# Patient Record
Sex: FEMALE | Race: WHITE | NOT HISPANIC OR LATINO | URBAN - METROPOLITAN AREA
[De-identification: names, ages, dates, MRNs, and addresses within clinical notes are randomized per-mention and may not be internally consistent; named-entity substitution may affect disease eponyms.]

---

## 2022-07-09 ENCOUNTER — TELEPHONE ENCOUNTER (OUTPATIENT)
Dept: URBAN - METROPOLITAN AREA CLINIC 121 | Facility: CLINIC | Age: 57
End: 2022-07-09

## 2022-07-10 ENCOUNTER — TELEPHONE ENCOUNTER (OUTPATIENT)
Dept: URBAN - METROPOLITAN AREA CLINIC 121 | Facility: CLINIC | Age: 57
End: 2022-07-10

## 2022-07-30 ENCOUNTER — TELEPHONE ENCOUNTER (OUTPATIENT)
Age: 57
End: 2022-07-30

## 2022-07-31 ENCOUNTER — TELEPHONE ENCOUNTER (OUTPATIENT)
Age: 57
End: 2022-07-31

## 2022-09-12 ENCOUNTER — WEB ENCOUNTER (OUTPATIENT)
Dept: URBAN - METROPOLITAN AREA CLINIC 7 | Facility: CLINIC | Age: 57
End: 2022-09-12

## 2022-09-12 ENCOUNTER — LAB OUTSIDE AN ENCOUNTER (OUTPATIENT)
Dept: URBAN - METROPOLITAN AREA CLINIC 7 | Facility: CLINIC | Age: 57
End: 2022-09-12

## 2022-09-12 ENCOUNTER — OFFICE VISIT (OUTPATIENT)
Dept: URBAN - METROPOLITAN AREA CLINIC 7 | Facility: CLINIC | Age: 57
End: 2022-09-12
Payer: COMMERCIAL

## 2022-09-12 VITALS
TEMPERATURE: 97.5 F | SYSTOLIC BLOOD PRESSURE: 110 MMHG | WEIGHT: 140 LBS | HEIGHT: 63 IN | BODY MASS INDEX: 24.8 KG/M2 | DIASTOLIC BLOOD PRESSURE: 72 MMHG

## 2022-09-12 DIAGNOSIS — K83.8 DILATION OF BILIARY TRACT: ICD-10-CM

## 2022-09-12 DIAGNOSIS — Z98.84 GASTRIC BYPASS STATUS FOR OBESITY: ICD-10-CM

## 2022-09-12 DIAGNOSIS — K21.9 GERD WITHOUT ESOPHAGITIS: ICD-10-CM

## 2022-09-12 DIAGNOSIS — K55.1 SUPERIOR MESENTERIC ARTERY SYNDROME: ICD-10-CM

## 2022-09-12 DIAGNOSIS — R10.84 GENERALIZED ABDOMINAL PAIN: ICD-10-CM

## 2022-09-12 PROBLEM — 698450007: Status: ACTIVE | Noted: 2022-09-12

## 2022-09-12 PROCEDURE — 99204 OFFICE O/P NEW MOD 45 MIN: CPT | Performed by: INTERNAL MEDICINE

## 2022-09-12 RX ORDER — OXYCODONE HYDROCHLORIDE AND ACETAMINOPHEN 10; 325 MG/1; MG/1
TAKE ONE TABLET BY MOUTH EVERY 4 HOURS AS NEEDED FOR PAIN TABLET ORAL
Qty: 180 UNSPECIFIED | Refills: 0 | Status: ACTIVE | COMMUNITY

## 2022-09-12 RX ORDER — ONDANSETRON 4 MG/1
PLACE ONE TABLET ON THE TONGUE EVERY 8 HOURS AS NEEDED TABLET, ORALLY DISINTEGRATING ORAL
Qty: 18 UNSPECIFIED | Refills: 0 | Status: ACTIVE | COMMUNITY

## 2022-09-12 RX ORDER — MULTIVITAMIN WITH IRON
1 TABLET WITH A MEAL TABLET ORAL ONCE A DAY
Qty: 30 | Status: ACTIVE | COMMUNITY
Start: 2022-09-12 | End: 2022-10-12

## 2022-09-12 RX ORDER — PANTOPRAZOLE SODIUM 40 MG/1
TABLET, DELAYED RELEASE ORAL
Qty: 180 TABLET | Status: ACTIVE | COMMUNITY

## 2022-09-12 NOTE — HPI-TODAY'S VISIT:
Patient is seen today with a complaint of abdominal  pain and concern about a partial bowel obstruction. The pain is of  months Sxs are recurrent and intermittent. The pain is a vague bloating,aching dull discomfort. It is poorly localized to epigastricum and central abdomen. It is non radiating  No exacerbating factors - unrelated to eating,change in position ,activity. Relieving factors - erutucation of flatus Severity mild Associated with nausea but no vomiting  Associated with increased GERD, early satiety and dyspepsia. No recent fevers or chills No weight loss No change in bowel habits. No dark urine or alcoholic stools. No significant NSAID use history. Distant hx biliary stricture for which had [facility] eval and then biliary bypass surgery Dr Caban.

## 2022-10-06 ENCOUNTER — OFFICE VISIT (OUTPATIENT)
Dept: URBAN - METROPOLITAN AREA SURGERY CENTER 5 | Facility: SURGERY CENTER | Age: 57
End: 2022-10-06

## 2022-10-25 ENCOUNTER — OFFICE VISIT (OUTPATIENT)
Dept: URBAN - METROPOLITAN AREA SURGERY CENTER 5 | Facility: SURGERY CENTER | Age: 57
End: 2022-10-25

## 2022-11-09 ENCOUNTER — CLAIMS CREATED FROM THE CLAIM WINDOW (OUTPATIENT)
Dept: URBAN - METROPOLITAN AREA CLINIC 4 | Facility: CLINIC | Age: 57
End: 2022-11-09
Payer: COMMERCIAL

## 2022-11-09 ENCOUNTER — TELEPHONE ENCOUNTER (OUTPATIENT)
Dept: URBAN - METROPOLITAN AREA CLINIC 7 | Facility: CLINIC | Age: 57
End: 2022-11-09

## 2022-11-09 ENCOUNTER — OFFICE VISIT (OUTPATIENT)
Dept: URBAN - METROPOLITAN AREA SURGERY CENTER 5 | Facility: SURGERY CENTER | Age: 57
End: 2022-11-09
Payer: COMMERCIAL

## 2022-11-09 DIAGNOSIS — R14.0 ABDOMINAL BLOATING: ICD-10-CM

## 2022-11-09 DIAGNOSIS — R10.13 ABDOMINAL DISCOMFORT, EPIGASTRIC: ICD-10-CM

## 2022-11-09 DIAGNOSIS — K31.A0 GASTRIC INTESTINAL METAPLASIA, UNSPECIFIED: ICD-10-CM

## 2022-11-09 DIAGNOSIS — Z98.84 H/O GASTRIC BYPASS: ICD-10-CM

## 2022-11-09 PROCEDURE — 88342 IMHCHEM/IMCYTCHM 1ST ANTB: CPT | Performed by: PATHOLOGY

## 2022-11-09 PROCEDURE — 43239 EGD BIOPSY SINGLE/MULTIPLE: CPT | Performed by: INTERNAL MEDICINE

## 2022-11-09 PROCEDURE — 88305 TISSUE EXAM BY PATHOLOGIST: CPT | Performed by: PATHOLOGY

## 2022-11-09 PROCEDURE — 43239 EGD BIOPSY SINGLE/MULTIPLE: CPT | Performed by: CLINIC/CENTER

## 2022-11-09 RX ORDER — OXYCODONE HYDROCHLORIDE AND ACETAMINOPHEN 10; 325 MG/1; MG/1
TAKE ONE TABLET BY MOUTH EVERY 4 HOURS AS NEEDED FOR PAIN TABLET ORAL
Qty: 180 UNSPECIFIED | Refills: 0 | Status: ACTIVE | COMMUNITY

## 2022-11-09 RX ORDER — ONDANSETRON 4 MG/1
PLACE ONE TABLET ON THE TONGUE EVERY 8 HOURS AS NEEDED TABLET, ORALLY DISINTEGRATING ORAL
Qty: 18 UNSPECIFIED | Refills: 0 | Status: ACTIVE | COMMUNITY

## 2022-11-09 RX ORDER — PANTOPRAZOLE SODIUM 40 MG/1
TABLET, DELAYED RELEASE ORAL
Qty: 180 TABLET | Status: ACTIVE | COMMUNITY

## 2023-01-23 ENCOUNTER — WEB ENCOUNTER (OUTPATIENT)
Dept: URBAN - METROPOLITAN AREA CLINIC 7 | Facility: CLINIC | Age: 58
End: 2023-01-23

## 2023-01-23 ENCOUNTER — OFFICE VISIT (OUTPATIENT)
Dept: URBAN - METROPOLITAN AREA CLINIC 7 | Facility: CLINIC | Age: 58
End: 2023-01-23
Payer: COMMERCIAL

## 2023-01-23 VITALS
TEMPERATURE: 97.8 F | SYSTOLIC BLOOD PRESSURE: 102 MMHG | WEIGHT: 140 LBS | DIASTOLIC BLOOD PRESSURE: 60 MMHG | BODY MASS INDEX: 24.8 KG/M2 | RESPIRATION RATE: 16 BRPM | HEIGHT: 63 IN

## 2023-01-23 DIAGNOSIS — K21.9 GERD WITHOUT ESOPHAGITIS: ICD-10-CM

## 2023-01-23 DIAGNOSIS — Z98.84 H/O GASTRIC BYPASS: ICD-10-CM

## 2023-01-23 DIAGNOSIS — R19.4 CHANGE IN BOWEL HABITS: ICD-10-CM

## 2023-01-23 PROBLEM — 266435005: Status: ACTIVE | Noted: 2022-09-12

## 2023-01-23 PROBLEM — 88111009: Status: ACTIVE | Noted: 2023-01-23

## 2023-01-23 PROCEDURE — 99214 OFFICE O/P EST MOD 30 MIN: CPT | Performed by: INTERNAL MEDICINE

## 2023-01-23 RX ORDER — OXYCODONE HYDROCHLORIDE AND ACETAMINOPHEN 10; 325 MG/1; MG/1
TAKE ONE TABLET BY MOUTH EVERY 4 HOURS AS NEEDED FOR PAIN TABLET ORAL
Qty: 180 UNSPECIFIED | Refills: 0 | Status: ACTIVE | COMMUNITY

## 2023-01-23 RX ORDER — ONDANSETRON 4 MG/1
PLACE ONE TABLET ON THE TONGUE EVERY 8 HOURS AS NEEDED TABLET, ORALLY DISINTEGRATING ORAL
Qty: 18 UNSPECIFIED | Refills: 0 | Status: ACTIVE | COMMUNITY

## 2023-01-23 RX ORDER — PANTOPRAZOLE SODIUM 40 MG/1
TABLET, DELAYED RELEASE ORAL
Qty: 180 TABLET | Status: ACTIVE | COMMUNITY

## 2023-01-23 NOTE — HPI-TODAY'S VISIT:
Seen today regarding a change in bowel habits with diarrhea alternating with constipation. Symptoms are chronic x last months Intermittent oily BMs. Associated with bloat/gas sxs. No overt bleeding. No fevers or chills. No night sweats. No weight loss. No flushing. No nausea or vomiting. No travel,sick contacts or new medications. No recent dietary changes. No excessive caffeine intake . No excessive artificial sweetener use including sorbitol and diabetic sweetened foods. No family history IBD,CRC. Has  not taken trial OTC treatment s/p gastric bypass surgery 2005  In 2019 required biliary surgery for stricture and at that time had reversal of the gastric bypass.

## 2023-01-23 NOTE — PHYSICAL EXAM GASTROINTESTINAL
Abdomen , soft, nontender, nondistended , no guarding or rigidity , no masses palpable , normal bowel sounds , Liver and Spleen,  no hepatosplenomegaly , liver nontender Multiple surgical scars.Small inscisional hernia.

## 2023-02-07 ENCOUNTER — OFFICE VISIT (OUTPATIENT)
Dept: URBAN - METROPOLITAN AREA SURGERY CENTER 5 | Facility: SURGERY CENTER | Age: 58
End: 2023-02-07

## 2023-02-25 LAB — PANCREATIC ELASTASE, FECAL: 268

## 2023-03-02 ENCOUNTER — OFFICE VISIT (OUTPATIENT)
Dept: URBAN - METROPOLITAN AREA SURGERY CENTER 5 | Facility: SURGERY CENTER | Age: 58
End: 2023-03-02

## 2023-03-07 ENCOUNTER — TELEPHONE ENCOUNTER (OUTPATIENT)
Dept: URBAN - METROPOLITAN AREA CLINIC 7 | Facility: CLINIC | Age: 58
End: 2023-03-07

## 2023-11-07 ENCOUNTER — TELEPHONE ENCOUNTER (OUTPATIENT)
Dept: URBAN - METROPOLITAN AREA CLINIC 7 | Facility: CLINIC | Age: 58
End: 2023-11-07

## 2023-11-07 ENCOUNTER — OFFICE VISIT (OUTPATIENT)
Dept: URBAN - METROPOLITAN AREA CLINIC 7 | Facility: CLINIC | Age: 58
End: 2023-11-07

## 2023-11-07 RX ORDER — OXYCODONE HYDROCHLORIDE AND ACETAMINOPHEN 10; 325 MG/1; MG/1
TAKE ONE TABLET BY MOUTH EVERY 4 HOURS AS NEEDED FOR PAIN TABLET ORAL
Qty: 180 UNSPECIFIED | Refills: 0 | Status: ACTIVE | COMMUNITY

## 2023-11-07 RX ORDER — PANTOPRAZOLE SODIUM 40 MG/1
TABLET, DELAYED RELEASE ORAL
Qty: 180 TABLET | Status: ACTIVE | COMMUNITY

## 2023-11-07 RX ORDER — ONDANSETRON 4 MG/1
PLACE ONE TABLET ON THE TONGUE EVERY 8 HOURS AS NEEDED TABLET, ORALLY DISINTEGRATING ORAL
Qty: 18 UNSPECIFIED | Refills: 0 | Status: ACTIVE | COMMUNITY

## 2023-11-10 ENCOUNTER — TELEPHONE ENCOUNTER (OUTPATIENT)
Dept: URBAN - METROPOLITAN AREA CLINIC 7 | Facility: CLINIC | Age: 58
End: 2023-11-10

## 2024-04-02 ENCOUNTER — LAB (OUTPATIENT)
Dept: URBAN - METROPOLITAN AREA CLINIC 7 | Facility: CLINIC | Age: 59
End: 2024-04-02

## 2024-04-02 ENCOUNTER — OV EP (OUTPATIENT)
Dept: URBAN - METROPOLITAN AREA CLINIC 7 | Facility: CLINIC | Age: 59
End: 2024-04-02
Payer: COMMERCIAL

## 2024-04-02 VITALS
WEIGHT: 142 LBS | DIASTOLIC BLOOD PRESSURE: 66 MMHG | TEMPERATURE: 97 F | SYSTOLIC BLOOD PRESSURE: 102 MMHG | BODY MASS INDEX: 25.16 KG/M2 | HEIGHT: 63 IN

## 2024-04-02 DIAGNOSIS — Z98.84 HX OF GASTRIC BYPASS: ICD-10-CM

## 2024-04-02 DIAGNOSIS — K21.9 GERD WITHOUT ESOPHAGITIS: ICD-10-CM

## 2024-04-02 DIAGNOSIS — R79.89 LFT ELEVATION: ICD-10-CM

## 2024-04-02 DIAGNOSIS — R10.84 GENERALIZED ABDOMINAL PAIN: ICD-10-CM

## 2024-04-02 DIAGNOSIS — K86.89 DILATED PANCREATIC DUCT: ICD-10-CM

## 2024-04-02 DIAGNOSIS — K83.8 DILATION OF BILIARY TRACT: ICD-10-CM

## 2024-04-02 PROBLEM — 698450007: Status: ACTIVE | Noted: 2024-04-02

## 2024-04-02 PROBLEM — 235969000: Status: ACTIVE | Noted: 2024-04-02

## 2024-04-02 PROBLEM — 102614006: Status: ACTIVE | Noted: 2024-04-02

## 2024-04-02 PROBLEM — 863927004: Status: ACTIVE | Noted: 2024-04-02

## 2024-04-02 PROCEDURE — 99214 OFFICE O/P EST MOD 30 MIN: CPT | Performed by: INTERNAL MEDICINE

## 2024-04-02 RX ORDER — DULOXETINE HYDROCHLORIDE 30 MG/1
TAKE ONE CAPSULE BY MOUTH ONE TIME DAILY CAPSULE, DELAYED RELEASE PELLETS ORAL
Qty: 60 UNSPECIFIED | Refills: 1 | Status: ACTIVE | COMMUNITY

## 2024-04-02 RX ORDER — HYDROCHLOROTHIAZIDE 25 MG/1
TAKE ONE TABLET BY MOUTH ONE TIME DAILY AS DIRECTED TABLET ORAL
Qty: 90 UNSPECIFIED | Refills: 1 | Status: ACTIVE | COMMUNITY

## 2024-04-02 RX ORDER — IPRATROPIUM BROMIDE 21 UG/1
SPRAY, METERED NASAL
Qty: 30 MILLILITER | Status: ACTIVE | COMMUNITY

## 2024-04-02 RX ORDER — PANTOPRAZOLE SODIUM 40 MG/1
TABLET, DELAYED RELEASE ORAL
Qty: 180 TABLET | Status: DISCONTINUED | COMMUNITY

## 2024-04-02 RX ORDER — GABAPENTIN 300 MG/1
TAKE ONE CAPSULE BY MOUTH AT BEDTIME CAPSULE ORAL
Qty: 90 UNSPECIFIED | Refills: 0 | Status: ACTIVE | COMMUNITY

## 2024-04-02 RX ORDER — ESTRADIOL 0.1 MG/G
INSERT 1 GRAM(S) VAGINALLY THREE TIMES WEEKLY CREAM VAGINAL
Qty: 42.5 UNSPECIFIED | Refills: 0 | Status: ACTIVE | COMMUNITY

## 2024-04-02 RX ORDER — MODAFINIL 200 MG/1
TAKE ONE TABLET BY MOUTH ONE TIME DAILY TABLET ORAL
Qty: 30 UNSPECIFIED | Refills: 1 | Status: ACTIVE | COMMUNITY

## 2024-04-02 RX ORDER — LIDOCAINE 50 MG/G
APPLY 1 PATCH TO AFFECTED AREA LEAVE ON FOR 12 HOURS AND REMOVE FOR 12 HOURS, MAY CUT PATCH PATCH CUTANEOUS
Qty: 15 UNSPECIFIED | Refills: 1 | Status: ACTIVE | COMMUNITY

## 2024-04-02 RX ORDER — SUMATRIPTAN SUCCINATE 25 MG/1
TAKE ONE TABLET BY MOUTH AT ONSET OF HEADACHE EVERY 24 HOURS AS NEEDED TABLET ORAL
Qty: 12 UNSPECIFIED | Refills: 0 | Status: ACTIVE | COMMUNITY

## 2024-04-02 RX ORDER — ONDANSETRON 4 MG/1
PLACE ONE TABLET ON THE TONGUE EVERY 8 HOURS AS NEEDED TABLET, ORALLY DISINTEGRATING ORAL
Qty: 18 UNSPECIFIED | Refills: 0 | Status: ACTIVE | COMMUNITY

## 2024-04-02 RX ORDER — OXYCODONE HYDROCHLORIDE AND ACETAMINOPHEN 10; 325 MG/1; MG/1
TAKE ONE TABLET BY MOUTH EVERY 4 HOURS AS NEEDED FOR PAIN TABLET ORAL
Qty: 180 UNSPECIFIED | Refills: 0 | Status: ACTIVE | COMMUNITY

## 2024-04-02 RX ORDER — OMEPRAZOLE 40 MG/1
1 CAPSULE 30 MINUTES BEFORE MORNING MEAL CAPSULE, DELAYED RELEASE ORAL ONCE A DAY
Status: ACTIVE | COMMUNITY

## 2024-04-02 RX ORDER — PROMETHAZINE HYDROCHLORIDE 12.5 MG/1
TAKE ONE TABLET BY MOUTH EVERY 6 HOURS AS NEEDED FOR NAUSEA TABLET ORAL
Qty: 30 UNSPECIFIED | Refills: 0 | Status: ACTIVE | COMMUNITY

## 2024-04-02 RX ORDER — ERGOCALCIFEROL 1.25 MG/1
TAKE ONE CAPSULE BY MOUTH EVERY WEEK CAPSULE, LIQUID FILLED ORAL
Qty: 12 UNSPECIFIED | Refills: 1 | Status: ACTIVE | COMMUNITY

## 2024-04-02 NOTE — HPI-TODAY'S VISIT:
Seen today after recent routine labs with incidental finding of elevated LFTs. Had subsequent US with hepatic cyst and prominent PD noted. Has mri ordered and is still pending. Hx prior eval for elevated LFTs and  years ago was seen at [facility] for dilated CBD. Had subsequent surgical biliary bypasss Dr Mccracken and reversal of gastric bypass Dr Valiente.. After this LFTs were normal  until recently. Is also having intermittent abd pain and bloating sxs.  Last seen  regarding a change in bowel habits with diarrhea alternating with constipation. Symptoms are chronic x last months Intermittent oily BMs. Associated with bloat/gas sxs. No overt bleeding. No fevers or chills. No night sweats. No weight loss. No flushing. No nausea or vomiting. No travel,sick contacts or new medications. No recent dietary changes. No excessive caffeine intake . No excessive artificial sweetener use including sorbitol and diabetic sweetened foods. No family history IBD,CRC. Has  not taken trial OTC treatment s/p gastric bypass surgery 2005  In 2019 required biliary surgery for stricture and at that time had reversal of the gastric bypass.